# Patient Record
Sex: FEMALE | Race: WHITE | NOT HISPANIC OR LATINO | Employment: UNEMPLOYED | URBAN - METROPOLITAN AREA
[De-identification: names, ages, dates, MRNs, and addresses within clinical notes are randomized per-mention and may not be internally consistent; named-entity substitution may affect disease eponyms.]

---

## 2018-02-23 ENCOUNTER — PATIENT OUTREACH (OUTPATIENT)
Dept: ADMINISTRATIVE | Facility: HOSPITAL | Age: 33
End: 2018-02-23

## 2018-03-09 ENCOUNTER — OFFICE VISIT (OUTPATIENT)
Dept: INTERNAL MEDICINE | Facility: CLINIC | Age: 33
End: 2018-03-09
Payer: MEDICAID

## 2018-03-09 ENCOUNTER — LAB VISIT (OUTPATIENT)
Dept: LAB | Facility: HOSPITAL | Age: 33
End: 2018-03-09
Attending: INTERNAL MEDICINE
Payer: MEDICAID

## 2018-03-09 VITALS
DIASTOLIC BLOOD PRESSURE: 84 MMHG | HEIGHT: 64 IN | BODY MASS INDEX: 34.48 KG/M2 | TEMPERATURE: 98 F | RESPIRATION RATE: 18 BRPM | OXYGEN SATURATION: 96 % | WEIGHT: 201.94 LBS | HEART RATE: 106 BPM | SYSTOLIC BLOOD PRESSURE: 128 MMHG

## 2018-03-09 DIAGNOSIS — R51.9 CHRONIC NONINTRACTABLE HEADACHE, UNSPECIFIED HEADACHE TYPE: ICD-10-CM

## 2018-03-09 DIAGNOSIS — E55.9 VITAMIN D DEFICIENCY: ICD-10-CM

## 2018-03-09 DIAGNOSIS — G89.29 CHRONIC LEFT-SIDED LOW BACK PAIN WITHOUT SCIATICA: Primary | ICD-10-CM

## 2018-03-09 DIAGNOSIS — M54.50 CHRONIC LEFT-SIDED LOW BACK PAIN WITHOUT SCIATICA: Primary | ICD-10-CM

## 2018-03-09 DIAGNOSIS — E66.9 OBESITY (BMI 30.0-34.9): ICD-10-CM

## 2018-03-09 DIAGNOSIS — G89.29 CHRONIC NONINTRACTABLE HEADACHE, UNSPECIFIED HEADACHE TYPE: ICD-10-CM

## 2018-03-09 DIAGNOSIS — Z71.89 COUNSELING ON HEALTH PROMOTION AND DISEASE PREVENTION: ICD-10-CM

## 2018-03-09 LAB
25(OH)D3+25(OH)D2 SERPL-MCNC: 7 NG/ML
ALBUMIN SERPL BCP-MCNC: 3.8 G/DL
ALP SERPL-CCNC: 81 U/L
ALT SERPL W/O P-5'-P-CCNC: 63 U/L
ANION GAP SERPL CALC-SCNC: 9 MMOL/L
AST SERPL-CCNC: 116 U/L
BASOPHILS # BLD AUTO: 0.09 K/UL
BASOPHILS NFR BLD: 0.8 %
BILIRUB SERPL-MCNC: 0.7 MG/DL
BUN SERPL-MCNC: 6 MG/DL
CALCIUM SERPL-MCNC: 10.2 MG/DL
CHLORIDE SERPL-SCNC: 100 MMOL/L
CO2 SERPL-SCNC: 27 MMOL/L
CREAT SERPL-MCNC: 1 MG/DL
DIFFERENTIAL METHOD: ABNORMAL
EOSINOPHIL # BLD AUTO: 0.3 K/UL
EOSINOPHIL NFR BLD: 2.3 %
ERYTHROCYTE [DISTWIDTH] IN BLOOD BY AUTOMATED COUNT: 14 %
EST. GFR  (AFRICAN AMERICAN): >60 ML/MIN/1.73 M^2
EST. GFR  (NON AFRICAN AMERICAN): >60 ML/MIN/1.73 M^2
GLUCOSE SERPL-MCNC: 193 MG/DL
HCT VFR BLD AUTO: 45.9 %
HGB BLD-MCNC: 15.4 G/DL
IMM GRANULOCYTES # BLD AUTO: 0.05 K/UL
IMM GRANULOCYTES NFR BLD AUTO: 0.5 %
LYMPHOCYTES # BLD AUTO: 2.8 K/UL
LYMPHOCYTES NFR BLD: 24.9 %
MCH RBC QN AUTO: 32 PG
MCHC RBC AUTO-ENTMCNC: 33.6 G/DL
MCV RBC AUTO: 95 FL
MONOCYTES # BLD AUTO: 0.6 K/UL
MONOCYTES NFR BLD: 5.7 %
NEUTROPHILS # BLD AUTO: 7.3 K/UL
NEUTROPHILS NFR BLD: 65.8 %
NRBC BLD-RTO: 0 /100 WBC
PLATELET # BLD AUTO: 251 K/UL
PMV BLD AUTO: 11.2 FL
POTASSIUM SERPL-SCNC: 4.2 MMOL/L
PROT SERPL-MCNC: 8.5 G/DL
RBC # BLD AUTO: 4.82 M/UL
SODIUM SERPL-SCNC: 136 MMOL/L
TSH SERPL DL<=0.005 MIU/L-ACNC: 2.33 UIU/ML
WBC # BLD AUTO: 11.03 K/UL

## 2018-03-09 PROCEDURE — 99204 OFFICE O/P NEW MOD 45 MIN: CPT | Mod: S$PBB,,, | Performed by: INTERNAL MEDICINE

## 2018-03-09 PROCEDURE — 84443 ASSAY THYROID STIM HORMONE: CPT

## 2018-03-09 PROCEDURE — 90471 IMMUNIZATION ADMIN: CPT | Mod: PBBFAC,PN

## 2018-03-09 PROCEDURE — 80053 COMPREHEN METABOLIC PANEL: CPT

## 2018-03-09 PROCEDURE — 85025 COMPLETE CBC W/AUTO DIFF WBC: CPT

## 2018-03-09 PROCEDURE — 99999 PR PBB SHADOW E&M-EST. PATIENT-LVL IV: CPT | Mod: PBBFAC,,, | Performed by: INTERNAL MEDICINE

## 2018-03-09 PROCEDURE — 82306 VITAMIN D 25 HYDROXY: CPT

## 2018-03-09 PROCEDURE — 36415 COLL VENOUS BLD VENIPUNCTURE: CPT | Mod: PO

## 2018-03-09 PROCEDURE — 99214 OFFICE O/P EST MOD 30 MIN: CPT | Mod: PBBFAC,PN,25 | Performed by: INTERNAL MEDICINE

## 2018-03-09 RX ORDER — CYCLOBENZAPRINE HCL 5 MG
5 TABLET ORAL NIGHTLY PRN
Qty: 10 TABLET | Refills: 0 | Status: SHIPPED | OUTPATIENT
Start: 2018-03-09 | End: 2018-03-19

## 2018-03-09 RX ORDER — IBUPROFEN 200 MG
200 TABLET ORAL
COMMUNITY
End: 2018-04-12 | Stop reason: SINTOL

## 2018-03-09 RX ORDER — DICLOFENAC SODIUM 10 MG/G
2 GEL TOPICAL 4 TIMES DAILY
Qty: 100 G | Refills: 1 | Status: SHIPPED | OUTPATIENT
Start: 2018-03-09 | End: 2019-09-18

## 2018-03-09 NOTE — PROGRESS NOTES
Subjective:      Patient ID: Zina Scott is a 32 y.o. female.    Chief Complaint: Back Pain (lower**)      Ms. Zina Scott is a patient of Quintin Kingston MD, who presents to establish primary care and LBP.    Back Pain   Associated symptoms include headaches. Pertinent negatives include no chest pain, dysuria or weakness.     She reports having LBP since Mid-, which she's not sure what the cause is. She reports it feels like a muscle problem more than her spine. She has not had any x-rays for evaluation. She tried ibuprofen, Tylenol, heating pads, pain relief creams, all of which causes temporary relief.     She endorses depression since , which she attributes to staying home with her son who has a heart condition, which has required surgery at [facility] in [facility].      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     Social History Main Topics    Smoking status: Heavy Tobacco Smoker     Packs/day: 1.00     Years: 13.00    Smokeless tobacco: Never Used      Comment: Wants to quit, but not ready    Alcohol use 0.0 - 3.0 oz/week      Comment: 4-5 drinks every year    Drug use: No    Sexual activity: Yes     Partners: Male     Birth control/ protection: None     Other Topics Concern    Not on file     Social History Narrative    No narrative on file     Goals     None        Family History   Problem Relation Age of Onset    Scoliosis Mother     Parkinsonism Paternal Grandmother        Current Outpatient Prescriptions:     ibuprofen (ADVIL,MOTRIN) 200 MG tablet, Take 200 mg by mouth., Disp: , Rfl:     cyclobenzaprine (FLEXERIL) 5  "MG tablet, Take 1 tablet (5 mg total) by mouth nightly as needed for Muscle spasms., Disp: 10 tablet, Rfl: 0    diclofenac sodium 1 % Gel, Apply 2 g topically 4 (four) times daily., Disp: 100 g, Rfl: 1    Review of patient's allergies indicates:  No Known Allergies    Review of Systems   Constitutional: Negative for activity change and unexpected weight change.   HENT: Negative for hearing loss, rhinorrhea and trouble swallowing.    Eyes: Negative for discharge and visual disturbance.   Respiratory: Negative for chest tightness and wheezing.    Cardiovascular: Negative for chest pain and palpitations.   Gastrointestinal: Negative for blood in stool, constipation, diarrhea and vomiting.   Endocrine: Negative for polydipsia and polyuria.   Genitourinary: Negative for difficulty urinating, dysuria, hematuria and menstrual problem.   Musculoskeletal: Positive for arthralgias and back pain. Negative for joint swelling and neck pain.   Neurological: Positive for headaches. Negative for weakness.   Psychiatric/Behavioral: Positive for dysphoric mood. Negative for confusion.        Objective:     /84 (BP Location: Right arm, Patient Position: Sitting, BP Method: Medium (Manual))   Pulse 106   Temp 97.6 °F (36.4 °C) (Tympanic)   Resp 18   Ht 5' 4" (1.626 m)   Wt 91.6 kg (201 lb 15.1 oz)   LMP 03/02/2018 (Exact Date)   SpO2 96%   BMI 34.66 kg/m²     Physical Exam  GEN: A&O fully, NAD  PSYC: Normal affect  HEENT: OP: Clear, no LAD, no thyroid masses  CV: RRR, no M/G/R  PULM: CTA bilaterally, no wheezes, rales  GI: S/NT/ND, normal bowel sounds  EXT: No C/C/E  NEURO: CN II-XII intact, 5/5 strength globally, no sensory losses, normal tandem gait, normal Romberg, 2+ DTRs globally      Assessment:      1. Chronic left-sided low back pain without sciatica: Likely 2/2 remote h/o RLE fx. Risks and benefits discussed and patient chose to move forward with diclofenac gel topically qid prn.     2. Counseling on health " promotion and disease prevention    3. Vitamin D deficiency    4. Obesity (BMI 30.0-34.9)    5. 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 Encouraged to try topical and PT instead of Tylenol & systemic NSAIDS as possible.       Plan:   Chronic left-sided low back pain without sciatica  -     diclofenac sodium 1 % Gel; Apply 2 g topically 4 (four) times daily.  Dispense: 100 g; Refill: 1  -     cyclobenzaprine (FLEXERIL) 5 MG tablet; Take 1 tablet (5 mg total) by mouth nightly as needed for Muscle spasms.  Dispense: 10 tablet; Refill: 0    Counseling on health promotion and disease prevention  -     (In Office Administered) Tdap Vaccine    Vitamin D deficiency  -     Vitamin D; Future; Expected date: 03/09/2018    Obesity (BMI 30.0-34.9)  -     (In Office Administered) Tdap Vaccine  -     Ambulatory consult to Physical Therapy  -     CBC auto differential; Future; Expected date: 03/09/2018  -     Comprehensive metabolic panel; Future; Expected date: 03/09/2018  -     TSH; Future; Expected date: 03/09/2018    Chronic nonintractable headache, unspecified headache type        Follow-up in about 2 months (around 5/9/2018), or if symptoms worsen or fail to improve, for FU on LBP.

## 2018-03-11 DIAGNOSIS — E55.9 VITAMIN D DEFICIENCY: Primary | ICD-10-CM

## 2018-03-11 DIAGNOSIS — R73.9 HYPERGLYCEMIA: ICD-10-CM

## 2018-03-11 RX ORDER — ERGOCALCIFEROL 1.25 MG/1
50000 CAPSULE ORAL
Qty: 8 CAPSULE | Refills: 5 | Status: SHIPPED | OUTPATIENT
Start: 2018-03-11 | End: 2019-09-18

## 2018-03-12 DIAGNOSIS — M54.50 CHRONIC LEFT-SIDED LOW BACK PAIN WITHOUT SCIATICA: Primary | ICD-10-CM

## 2018-03-12 DIAGNOSIS — G89.29 CHRONIC LEFT-SIDED LOW BACK PAIN WITHOUT SCIATICA: Primary | ICD-10-CM

## 2018-03-13 ENCOUNTER — TELEPHONE (OUTPATIENT)
Dept: INTERNAL MEDICINE | Facility: CLINIC | Age: 33
End: 2018-03-13

## 2018-03-13 ENCOUNTER — PATIENT MESSAGE (OUTPATIENT)
Dept: INTERNAL MEDICINE | Facility: CLINIC | Age: 33
End: 2018-03-13

## 2018-03-13 NOTE — TELEPHONE ENCOUNTER
----- Message from Ana Crews sent at 3/12/2018  4:11 PM CDT -----  Contact: pt  Calling with questions and please advise [phone] (home)

## 2018-04-02 ENCOUNTER — PATIENT MESSAGE (OUTPATIENT)
Dept: INTERNAL MEDICINE | Facility: CLINIC | Age: 33
End: 2018-04-02

## 2018-04-12 ENCOUNTER — OFFICE VISIT (OUTPATIENT)
Dept: INTERNAL MEDICINE | Facility: CLINIC | Age: 33
End: 2018-04-12
Payer: MEDICAID

## 2018-04-12 VITALS
OXYGEN SATURATION: 98 % | SYSTOLIC BLOOD PRESSURE: 126 MMHG | WEIGHT: 201.75 LBS | DIASTOLIC BLOOD PRESSURE: 73 MMHG | HEART RATE: 111 BPM | RESPIRATION RATE: 18 BRPM | HEIGHT: 64 IN | BODY MASS INDEX: 34.44 KG/M2 | TEMPERATURE: 98 F

## 2018-04-12 DIAGNOSIS — Z71.89 COUNSELING ON HEALTH PROMOTION AND DISEASE PREVENTION: ICD-10-CM

## 2018-04-12 DIAGNOSIS — M25.522 LEFT ELBOW PAIN: Primary | ICD-10-CM

## 2018-04-12 PROCEDURE — 90471 IMMUNIZATION ADMIN: CPT | Mod: PBBFAC,PN

## 2018-04-12 PROCEDURE — 99213 OFFICE O/P EST LOW 20 MIN: CPT | Mod: S$PBB,,, | Performed by: INTERNAL MEDICINE

## 2018-04-12 PROCEDURE — 99213 OFFICE O/P EST LOW 20 MIN: CPT | Mod: PBBFAC,PN,25 | Performed by: INTERNAL MEDICINE

## 2018-04-12 PROCEDURE — 99999 PR PBB SHADOW E&M-EST. PATIENT-LVL III: CPT | Mod: PBBFAC,,, | Performed by: INTERNAL MEDICINE

## 2018-04-12 RX ORDER — NAPROXEN 500 MG/1
TABLET ORAL
Refills: 0 | COMMUNITY
Start: 2018-04-01 | End: 2018-04-12 | Stop reason: SINTOL

## 2018-04-12 RX ORDER — METHOCARBAMOL 500 MG/1
TABLET, FILM COATED ORAL
Refills: 0 | COMMUNITY
Start: 2018-04-01 | End: 2019-09-18

## 2018-04-12 RX ORDER — OXYCODONE HCL 10 MG/1
10 TABLET, FILM COATED, EXTENDED RELEASE ORAL NIGHTLY PRN
Qty: 14 TABLET | Refills: 0 | Status: SHIPPED | OUTPATIENT
Start: 2018-04-12 | End: 2018-04-16

## 2018-04-12 NOTE — PROGRESS NOTES
Subjective:      Patient ID: Zina Scott is a 32 y.o. female.    Chief Complaint: Fall and Joint Swelling      HPI     Ms. Zina Scott is a patient of Quintin Kingston MD, who presents for f/u on left arm pain 2/2 fall from stairs on . She has been applying ice, taking NSAIDs, and elevating, but still has intermittent left elbow 8/10 intensity pain occasionally. She plans to have PT. She denies drinking alcohol. She was seen in the ER, at which time head CT and x-rays were taken, which were unremarkable.       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     Social History Main Topics    Smoking status: Heavy Tobacco Smoker     Packs/day: 1.00     Years: 13.00    Smokeless tobacco: Never Used      Comment: Wants to quit, but not ready    Alcohol use 0.0 - 3.0 oz/week      Comment: 4-5 drinks every year    Drug use: No    Sexual activity: Yes     Partners: Male     Birth control/ protection: None     Other Topics Concern    Not on file     Social History Narrative    No narrative on file     Family History   Problem Relation Age of Onset    Scoliosis Mother     Parkinsonism Paternal Grandmother        Current Outpatient Prescriptions:     ergocalciferol (ERGOCALCIFEROL) 50,000 unit Cap, Take 1 capsule (50,000 Units total) by mouth every 7 days., Disp: 8 capsule, Rfl: 5    methocarbamol (ROBAXIN) 500 MG Tab, TK 1 T PO QID PRF SPASMS, Disp: , Rfl: 0    diclofenac sodium 1 % Gel, Apply 2 g topically 4 (four) times daily., Disp: 100 g, Rfl: 1    oxyCODONE (OXYCONTIN) 10 mg 12 hr tablet, Take 1 tablet (10 mg total) by mouth  "nightly as needed for Pain., Disp: 14 tablet, Rfl: 0    Review of patient's allergies indicates:  No Known Allergies     Review of Systems   No numbness       Objective:     /73 (BP Location: Right arm, Patient Position: Sitting, BP Method: Large (Automatic))   Pulse (!) 111   Temp 98 °F (36.7 °C) (Tympanic)   Resp 18   Ht 5' 4" (1.626 m)   Wt 91.5 kg (201 lb 11.5 oz)   LMP 03/23/2018 (Exact Date)   SpO2 98%   BMI 34.63 kg/m²     Physical Exam  GEN: A&O fully, NAD  PSYC: Normal affect      Lab Results   Component Value Date    WBC 11.03 03/09/2018    HGB 15.4 03/09/2018    HCT 45.9 03/09/2018     03/09/2018    ALT 63 (H) 03/09/2018     (H) 03/09/2018     03/09/2018    K 4.2 03/09/2018     03/09/2018    CREATININE 1.0 03/09/2018    BUN 6 03/09/2018    CO2 27 03/09/2018    TSH 2.333 03/09/2018       Assessment:      1. Left elbow pain: Acute. 2/2 accidental trauma. W/u negative. Risks and benefits discussed and patient chose to move forward with Tylenol 650 mg 1 po TID and for breakthrough oxycodone 10 mg 1 po QHS prn. Will hold off on PT pertaining to left elbow for now. She is scheduled for PT for her back pain.   2.      Obesity: Has increased water intake!    Plan:   Left elbow pain  -     oxyCODONE (OXYCONTIN) 10 mg 12 hr tablet; Take 1 tablet (10 mg total) by mouth nightly as needed for Pain.  Dispense: 14 tablet; Refill: 0    Counseling on health promotion and disease prevention  -     (In Office Administered) Pneumococcal Polysaccharide Vaccine (23 Valent) (SQ/IM)      No Follow-up on file.  "

## 2018-04-16 ENCOUNTER — TELEPHONE (OUTPATIENT)
Dept: INTERNAL MEDICINE | Facility: CLINIC | Age: 33
End: 2018-04-16

## 2018-04-16 DIAGNOSIS — M25.522 LEFT ELBOW PAIN: Primary | ICD-10-CM

## 2018-04-16 RX ORDER — HYDROCODONE BITARTRATE AND ACETAMINOPHEN 10; 325 MG/1; MG/1
1 TABLET ORAL
Qty: 14 TABLET | Refills: 0 | Status: SHIPPED | OUTPATIENT
Start: 2018-04-16 | End: 2019-09-18

## 2018-04-16 NOTE — TELEPHONE ENCOUNTER
----- Message from Luciano Joseph sent at 4/16/2018 10:32 AM CDT -----  Contact: self [phone]  Would like to consult with nurse regarding status prior authorization for medication.  Please call back at [phone].  Md Fran

## 2018-04-16 NOTE — TELEPHONE ENCOUNTER
----- Message from Luciano Joseph sent at 4/13/2018  3:51 PM CDT -----  Contact: self [phone]  Would like to consult with nurse regarding status of prior authorization for Oxycontin.  Please call back at [phone].  Md Fran

## 2018-04-16 NOTE — TELEPHONE ENCOUNTER
PA for Oxycontin is requesting more paperwork to be filled out. Please review and advise or send in alternative rx.

## 2018-04-25 ENCOUNTER — TELEPHONE (OUTPATIENT)
Dept: INTERNAL MEDICINE | Facility: CLINIC | Age: 33
End: 2018-04-25

## 2018-04-27 ENCOUNTER — PATIENT OUTREACH (OUTPATIENT)
Dept: ADMINISTRATIVE | Facility: HOSPITAL | Age: 33
End: 2018-04-27

## 2018-07-30 ENCOUNTER — TELEPHONE (OUTPATIENT)
Dept: INTERNAL MEDICINE | Facility: CLINIC | Age: 33
End: 2018-07-30

## 2018-08-08 ENCOUNTER — TELEPHONE (OUTPATIENT)
Dept: INTERNAL MEDICINE | Facility: CLINIC | Age: 33
End: 2018-08-08

## 2018-08-14 ENCOUNTER — TELEPHONE (OUTPATIENT)
Dept: INTERNAL MEDICINE | Facility: CLINIC | Age: 33
End: 2018-08-14

## 2018-08-20 ENCOUNTER — TELEPHONE (OUTPATIENT)
Dept: INTERNAL MEDICINE | Facility: CLINIC | Age: 33
End: 2018-08-20

## 2019-07-18 ENCOUNTER — OFFICE VISIT (OUTPATIENT)
Dept: INTERNAL MEDICINE | Facility: CLINIC | Age: 34
End: 2019-07-18
Payer: MEDICAID

## 2019-07-18 ENCOUNTER — LAB VISIT (OUTPATIENT)
Dept: LAB | Facility: HOSPITAL | Age: 34
End: 2019-07-18
Payer: MEDICAID

## 2019-07-18 VITALS
DIASTOLIC BLOOD PRESSURE: 74 MMHG | RESPIRATION RATE: 18 BRPM | OXYGEN SATURATION: 98 % | TEMPERATURE: 99 F | WEIGHT: 184.31 LBS | HEART RATE: 105 BPM | BODY MASS INDEX: 28.93 KG/M2 | SYSTOLIC BLOOD PRESSURE: 118 MMHG | HEIGHT: 67 IN

## 2019-07-18 DIAGNOSIS — Z00.00 ANNUAL PHYSICAL EXAM: ICD-10-CM

## 2019-07-18 DIAGNOSIS — Z00.00 ANNUAL PHYSICAL EXAM: Primary | ICD-10-CM

## 2019-07-18 LAB
25(OH)D3+25(OH)D2 SERPL-MCNC: 18 NG/ML (ref 30–96)
ALBUMIN SERPL BCP-MCNC: 3.6 G/DL (ref 3.5–5.2)
ALP SERPL-CCNC: 80 U/L (ref 55–135)
ALT SERPL W/O P-5'-P-CCNC: 33 U/L (ref 10–44)
ANION GAP SERPL CALC-SCNC: 10 MMOL/L (ref 8–16)
AST SERPL-CCNC: 36 U/L (ref 10–40)
BASOPHILS # BLD AUTO: 0.05 K/UL (ref 0–0.2)
BASOPHILS NFR BLD: 0.5 % (ref 0–1.9)
BILIRUB SERPL-MCNC: 0.4 MG/DL (ref 0.1–1)
BUN SERPL-MCNC: 9 MG/DL (ref 6–20)
CALCIUM SERPL-MCNC: 9.4 MG/DL (ref 8.7–10.5)
CHLORIDE SERPL-SCNC: 103 MMOL/L (ref 95–110)
CHOLEST SERPL-MCNC: 202 MG/DL (ref 120–199)
CHOLEST/HDLC SERPL: 5.3 {RATIO} (ref 2–5)
CO2 SERPL-SCNC: 24 MMOL/L (ref 23–29)
CREAT SERPL-MCNC: 0.9 MG/DL (ref 0.5–1.4)
DIFFERENTIAL METHOD: ABNORMAL
EOSINOPHIL # BLD AUTO: 0.2 K/UL (ref 0–0.5)
EOSINOPHIL NFR BLD: 1.8 % (ref 0–8)
ERYTHROCYTE [DISTWIDTH] IN BLOOD BY AUTOMATED COUNT: 14.2 % (ref 11.5–14.5)
EST. GFR  (AFRICAN AMERICAN): >60 ML/MIN/1.73 M^2
EST. GFR  (NON AFRICAN AMERICAN): >60 ML/MIN/1.73 M^2
GLUCOSE SERPL-MCNC: 278 MG/DL (ref 70–110)
HCT VFR BLD AUTO: 48.3 % (ref 37–48.5)
HDLC SERPL-MCNC: 38 MG/DL (ref 40–75)
HDLC SERPL: 18.8 % (ref 20–50)
HGB BLD-MCNC: 15.9 G/DL (ref 12–16)
IMM GRANULOCYTES # BLD AUTO: 0.04 K/UL (ref 0–0.04)
IMM GRANULOCYTES NFR BLD AUTO: 0.4 % (ref 0–0.5)
LDLC SERPL CALC-MCNC: 96.2 MG/DL (ref 63–159)
LYMPHOCYTES # BLD AUTO: 2.6 K/UL (ref 1–4.8)
LYMPHOCYTES NFR BLD: 27 % (ref 18–48)
MCH RBC QN AUTO: 32.1 PG (ref 27–31)
MCHC RBC AUTO-ENTMCNC: 32.9 G/DL (ref 32–36)
MCV RBC AUTO: 97 FL (ref 82–98)
MONOCYTES # BLD AUTO: 0.6 K/UL (ref 0.3–1)
MONOCYTES NFR BLD: 6.4 % (ref 4–15)
NEUTROPHILS # BLD AUTO: 6.1 K/UL (ref 1.8–7.7)
NEUTROPHILS NFR BLD: 63.9 % (ref 38–73)
NONHDLC SERPL-MCNC: 164 MG/DL
NRBC BLD-RTO: 0 /100 WBC
PLATELET # BLD AUTO: 244 K/UL (ref 150–350)
PMV BLD AUTO: 11.5 FL (ref 9.2–12.9)
POTASSIUM SERPL-SCNC: 4.3 MMOL/L (ref 3.5–5.1)
PROT SERPL-MCNC: 8 G/DL (ref 6–8.4)
RBC # BLD AUTO: 4.96 M/UL (ref 4–5.4)
SODIUM SERPL-SCNC: 137 MMOL/L (ref 136–145)
TRIGL SERPL-MCNC: 339 MG/DL (ref 30–150)
WBC # BLD AUTO: 9.52 K/UL (ref 3.9–12.7)

## 2019-07-18 PROCEDURE — 80061 LIPID PANEL: CPT

## 2019-07-18 PROCEDURE — 82306 VITAMIN D 25 HYDROXY: CPT

## 2019-07-18 PROCEDURE — 99395 PREV VISIT EST AGE 18-39: CPT | Mod: S$PBB,,, | Performed by: INTERNAL MEDICINE

## 2019-07-18 PROCEDURE — 99213 OFFICE O/P EST LOW 20 MIN: CPT | Mod: PBBFAC,PN | Performed by: INTERNAL MEDICINE

## 2019-07-18 PROCEDURE — 99999 PR PBB SHADOW E&M-EST. PATIENT-LVL III: ICD-10-PCS | Mod: PBBFAC,,, | Performed by: INTERNAL MEDICINE

## 2019-07-18 PROCEDURE — 85025 COMPLETE CBC W/AUTO DIFF WBC: CPT

## 2019-07-18 PROCEDURE — 80053 COMPREHEN METABOLIC PANEL: CPT

## 2019-07-18 PROCEDURE — 36415 COLL VENOUS BLD VENIPUNCTURE: CPT | Mod: PO

## 2019-07-18 PROCEDURE — 99395 PR PREVENTIVE VISIT,EST,18-39: ICD-10-PCS | Mod: S$PBB,,, | Performed by: INTERNAL MEDICINE

## 2019-07-18 PROCEDURE — 99999 PR PBB SHADOW E&M-EST. PATIENT-LVL III: CPT | Mod: PBBFAC,,, | Performed by: INTERNAL MEDICINE

## 2019-07-18 NOTE — PROGRESS NOTES
Subjective:      Patient ID: Zina Scott is a 34 y.o. female.    Chief Complaint: Annual Exam      HPI     Ms. Zina Scott is a patient of Quintin Kingston MD, who presents for Annual Exam    She reports feeling well. No problems. She is up to losing more weight with a goal of 170 lbs by 19 and is considering quitting smoking. She notes having SI with Welbutrin in the past.       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    Obesity (BMI 30.0-34.9)     Superficial vein thrombosis      Past Surgical History:   Procedure Laterality Date     SECTION      x3     Social History     Socioeconomic History    Marital status: Single     Spouse name: Not on file    Number of children: Not on file    Years of education: Not on file    Highest education level: Not on file   Occupational History    Not on file   Social Needs    Financial resource strain: Not on file    Food insecurity:     Worry: Not on file     Inability: Not on file    Transportation needs:     Medical: Not on file     Non-medical: Not on file   Tobacco Use    Smoking status: Heavy Tobacco Smoker     Packs/day: 1.00     Years: 13.00     Pack years: 13.00    Smokeless tobacco: Never Used    Tobacco comment: Wants to quit, but not ready   Substance and Sexual Activity    Alcohol use: Yes     Alcohol/week: 0.0 - 3.0 oz     Comment: 4-5 drinks every year    Drug use: No    Sexual activity: Yes     Partners: Male     Birth control/protection: None   Lifestyle    Physical activity:     Days per week: Not on file     Minutes per session: Not on file    Stress: Not on file   Relationships    Social connections:     Talks on phone: Not on file     Gets together: Not on file     Attends Evangelical service: Not on file     Active member of club or organization: Not on file     Attends meetings of clubs or organizations: Not on file     Relationship status: Not on file   Other Topics  "Concern    Not on file   Social History Narrative    Patient goal(s): Quit smoking; weight of 170 lbs by 12/31/19        Motivation for goals (0-10): 9/10    Breakfast: Skip; coffee 2 tsp cream & 2 tsp sugar    Lunch: Skip; Coke (5-6 cans/d)    Dinner: Anchelata (ground beef, chicken, cheese), crackers; Coke    Snacks: 1 bag chips (1x/mo), Little Melissa Cakes (1x/mo), sunflower seeds     Eating out: 1x/mo    Water (oz/day): 51 oz/WEEK    Physical Activity: None      Family History   Problem Relation Age of Onset    Scoliosis Mother     Alcohol abuse Father     Parkinsonism Paternal Grandmother        Current Outpatient Medications:     diclofenac sodium 1 % Gel, Apply 2 g topically 4 (four) times daily., Disp: 100 g, Rfl: 1    ergocalciferol (ERGOCALCIFEROL) 50,000 unit Cap, Take 1 capsule (50,000 Units total) by mouth every 7 days., Disp: 8 capsule, Rfl: 5    hydrocodone-acetaminophen 10-325mg (NORCO)  mg Tab, Take 1 tablet by mouth every 24 hours as needed for Pain., Disp: 14 tablet, Rfl: 0    methocarbamol (ROBAXIN) 500 MG Tab, TK 1 T PO QID PRF SPASMS, Disp: , Rfl: 0    Review of patient's allergies indicates:  No Known Allergies     Review of Systems   All remaining systems negative    Objective:     /74 (BP Location: Left arm, Patient Position: Sitting, BP Method: Medium (Manual))   Pulse 105   Temp 98.6 °F (37 °C)   Resp 18   Ht 5' 7" (1.702 m)   Wt 83.6 kg (184 lb 4.9 oz)   LMP 06/26/2019   SpO2 98%   BMI 28.87 kg/m²     Physical Exam  GEN: A&O fully, NAD  PSYC: Normal affect  HEENT: OP: Clear, no LAD, no thyroid masses, auditory canals and TMs WNL  CV: RRR, no M/G/R  PULM: + mild end-inspiratory wheezes bilaterally, no rales or crackles   GI: S/NT/ND, normal bowel sounds  EXT: No C/C/E, normal DP pulses bilaterally  NEURO: CN II-XII intact, 5/5 strength globally, no sensory losses, normal tandem gait, normal Romberg, 2+ DTRs globally      Lab Results   Component Value Date    WBC " 11.03 03/09/2018    HGB 15.4 03/09/2018    HCT 45.9 03/09/2018     03/09/2018    ALT 63 (H) 03/09/2018     (H) 03/09/2018     03/09/2018    K 4.2 03/09/2018     03/09/2018    CREATININE 1.0 03/09/2018    BUN 6 03/09/2018    CO2 27 03/09/2018    CALCIUM 10.2 03/09/2018       Assessment:      1. Annual physical exam        Plan:   Annual physical exam  -     CBC auto differential; Future; Expected date: 07/18/2019  -     Comprehensive metabolic panel; Future; Expected date: 07/18/2019  -     Lipid panel; Future; Expected date: 07/18/2019  -     Vitamin D; Future; Expected date: 07/18/2019  -     Ambulatory consult to Obstetrics / Gynecology        Follow up in about 3 months (around 10/18/2019), or if symptoms worsen or fail to improve, for FU on smoking, overweight.

## 2019-09-17 ENCOUNTER — PATIENT MESSAGE (OUTPATIENT)
Dept: INTERNAL MEDICINE | Facility: CLINIC | Age: 34
End: 2019-09-17

## 2019-09-18 ENCOUNTER — OFFICE VISIT (OUTPATIENT)
Dept: INTERNAL MEDICINE | Facility: CLINIC | Age: 34
End: 2019-09-18
Payer: MEDICAID

## 2019-09-18 ENCOUNTER — APPOINTMENT (OUTPATIENT)
Dept: RADIOLOGY | Facility: HOSPITAL | Age: 34
End: 2019-09-18
Attending: INTERNAL MEDICINE
Payer: MEDICAID

## 2019-09-18 VITALS
OXYGEN SATURATION: 97 % | HEIGHT: 67 IN | TEMPERATURE: 98 F | BODY MASS INDEX: 28.04 KG/M2 | WEIGHT: 178.69 LBS | DIASTOLIC BLOOD PRESSURE: 71 MMHG | HEART RATE: 103 BPM | SYSTOLIC BLOOD PRESSURE: 108 MMHG

## 2019-09-18 DIAGNOSIS — M25.562 ACUTE PAIN OF LEFT KNEE: ICD-10-CM

## 2019-09-18 DIAGNOSIS — M25.562 ACUTE PAIN OF LEFT KNEE: Primary | ICD-10-CM

## 2019-09-18 PROCEDURE — 73562 X-RAY EXAM OF KNEE 3: CPT | Mod: 26,50,, | Performed by: RADIOLOGY

## 2019-09-18 PROCEDURE — 99213 OFFICE O/P EST LOW 20 MIN: CPT | Mod: S$PBB,,, | Performed by: INTERNAL MEDICINE

## 2019-09-18 PROCEDURE — 99999 PR PBB SHADOW E&M-EST. PATIENT-LVL III: CPT | Mod: PBBFAC,,, | Performed by: INTERNAL MEDICINE

## 2019-09-18 PROCEDURE — 99213 OFFICE O/P EST LOW 20 MIN: CPT | Mod: PBBFAC,PN | Performed by: INTERNAL MEDICINE

## 2019-09-18 PROCEDURE — 73562 XR KNEE ORTHO BILAT: ICD-10-PCS | Mod: 26,50,, | Performed by: RADIOLOGY

## 2019-09-18 PROCEDURE — 99999 PR PBB SHADOW E&M-EST. PATIENT-LVL III: ICD-10-PCS | Mod: PBBFAC,,, | Performed by: INTERNAL MEDICINE

## 2019-09-18 PROCEDURE — 99213 PR OFFICE/OUTPT VISIT, EST, LEVL III, 20-29 MIN: ICD-10-PCS | Mod: S$PBB,,, | Performed by: INTERNAL MEDICINE

## 2019-09-18 PROCEDURE — 73562 X-RAY EXAM OF KNEE 3: CPT | Mod: TC,50,PO

## 2019-09-18 RX ORDER — DICLOFENAC SODIUM 10 MG/G
2 GEL TOPICAL 4 TIMES DAILY
Qty: 100 G | Refills: 0 | Status: SHIPPED | OUTPATIENT
Start: 2019-09-18 | End: 2020-02-17

## 2019-09-18 NOTE — PROGRESS NOTES
Subjective:      Patient ID: Zina Scott is a 34 y.o. female.    Chief Complaint: Knee Pain      Knee Pain         Ms. Zina Scott is a patient of Quintin Kingston MD, who presents for Knee Pain    She reports being hit in the left by two large dogs from the side on ~19 by accident, which caused her to fall down, for which she applied ice and took ibuprofen, which helped temporarily. While walking she notes instability and pain. It also hurts when changing positions, including 8/10 this am.    Of note, EPIC indicates due preventive measures, including FLU & PAP.      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    Obesity (BMI 30.0-34.9)     Superficial vein thrombosis      Past Surgical History:   Procedure Laterality Date     SECTION      x3     Social History     Socioeconomic History    Marital status: Single     Spouse name: Not on file    Number of children: Not on file    Years of education: Not on file    Highest education level: Not on file   Occupational History    Not on file   Social Needs    Financial resource strain: Not on file    Food insecurity:     Worry: Not on file     Inability: Not on file    Transportation needs:     Medical: Not on file     Non-medical: Not on file   Tobacco Use    Smoking status: Heavy Tobacco Smoker     Packs/day: 1.00     Years: 13.00     Pack years: 13.00    Smokeless tobacco: Never Used    Tobacco comment: Wants to quit, but not ready   Substance and Sexual Activity    Alcohol use: Yes     Alcohol/week: 0.0 - 3.0 oz     Comment: 4-5 drinks every year    Drug use: No    Sexual activity: Yes     Partners: Male     Birth control/protection: None   Lifestyle    Physical activity:     Days per week: Not on file     Minutes per session: Not on file    Stress: Not on file   Relationships    Social connections:     Talks on phone: Not on file     Gets together: Not on file     Attends Druze  service: Not on file     Active member of club or organization: Not on file     Attends meetings of clubs or organizations: Not on file     Relationship status: Not on file   Other Topics Concern    Not on file   Social History Narrative    Patient goal(s): Quit smoking; weight of 170 lbs by 12/31/19        Motivation for goals (0-10): 9/10    Breakfast: Skip; coffee 2 tsp cream & 2 tsp sugar    Lunch: Skip; Coke (5-6 cans/d)    Dinner: Anchelata (ground beef, chicken, cheese), crackers; Coke    Snacks: 1 bag chips (1x/mo), Little Melissa Cakes (1x/mo), sunflower seeds     Eating out: 1x/mo    Water (oz/day): 51 oz/WEEK    Physical Activity: None      Family History   Problem Relation Age of Onset    Scoliosis Mother     Alcohol abuse Father     Parkinsonism Paternal Grandmother        Current Outpatient Medications:     diclofenac sodium (VOLTAREN) 1 % Gel, Apply 2 g topically 4 (four) times daily., Disp: 100 g, Rfl: 0    Review of patient's allergies indicates:  No Known Allergies     Review of Systems   Constitutional: Positive for activity change. Negative for unexpected weight change.   HENT: Negative for hearing loss, rhinorrhea and trouble swallowing.    Eyes: Negative for discharge and visual disturbance.   Respiratory: Negative for chest tightness and wheezing.    Cardiovascular: Negative for chest pain and palpitations.   Gastrointestinal: Negative for blood in stool, constipation, diarrhea and vomiting.   Endocrine: Negative for polydipsia and polyuria.   Genitourinary: Negative for difficulty urinating, dysuria, hematuria and menstrual problem.   Musculoskeletal: Positive for arthralgias. Negative for joint swelling and neck pain.   Neurological: Positive for weakness. Negative for headaches.   Psychiatric/Behavioral: Negative for confusion and dysphoric mood.        Objective:     /71 (BP Location: Left arm, Patient Position: Sitting)   Pulse 103   Temp 97.7 °F (36.5 °C) (Other (see  "comments))   Ht 5' 7" (1.702 m)   Wt 81.1 kg (178 lb 10.9 oz)   LMP 09/07/2019   SpO2 97%   BMI 27.99 kg/m²     Physical Exam  GEN: A&O fully, NAD  PSYC: Normal affect  MSK: left knee with mild edema, warmth, TTP laterally  MSK: Negative ant/posterior drawer signs; no pain with medial (varus) pressure upon extension, but pain with lateral (valgus) maneuver      Lab Results   Component Value Date    WBC 9.52 07/18/2019    HGB 15.9 07/18/2019    HCT 48.3 07/18/2019     07/18/2019    CHOL 202 (H) 07/18/2019    TRIG 339 (H) 07/18/2019    HDL 38 (L) 07/18/2019    LDLCALC 96.2 07/18/2019    ALT 33 07/18/2019    AST 36 07/18/2019     07/18/2019    K 4.3 07/18/2019     07/18/2019    CREATININE 0.9 07/18/2019    BUN 9 07/18/2019    CO2 24 07/18/2019    CALCIUM 9.4 07/18/2019       Assessment:      1. Acute pain of left knee: Meniscal tear suspected. I recommended over-the-counter turmeric 500 mg 1 capsule by mouth daily (with a meal), which can be increased to 2 capsules per day and/or tylenol 650 mg by mouth three times daily as needed. I recommend avoiding chronic (>2 weeks) NSAIDS (nonsteroidal inflammatory drugs; i.e. Ibuprofen, Motrin, Advil, Aleve, Naprosyn, naproxen, Aspirin, Goodies, Stanback, BC's powder, oral diclofenac, Mobic, meloxicam, etc.) to protect your stomach from bleeding and to your kidneys from dysfunction. If these measures are not helpful, I recommend applying Voltaren gel (prescription grade topical NSAID) up to 4x/day. If MRI abnormal, will refer to ortho.        Plan:   Acute pain of left knee  -     diclofenac sodium (VOLTAREN) 1 % Gel; Apply 2 g topically 4 (four) times daily.  Dispense: 100 g; Refill: 0  -     X-ray Knee Ortho Bilateral; Future; Expected date: 09/18/2019  -     MRI Knee W WO Contrast Left; Future; Expected date: 09/18/2019        No follow-ups on file.    I spent >25 minutes of time with patient 50% or more of which was discussing labs and plans of " care.

## 2019-09-19 ENCOUNTER — PATIENT MESSAGE (OUTPATIENT)
Dept: INTERNAL MEDICINE | Facility: CLINIC | Age: 34
End: 2019-09-19

## 2020-02-10 ENCOUNTER — PATIENT MESSAGE (OUTPATIENT)
Dept: INTERNAL MEDICINE | Facility: CLINIC | Age: 35
End: 2020-02-10

## 2020-02-10 DIAGNOSIS — J40 BRONCHITIS: Primary | ICD-10-CM

## 2020-02-10 RX ORDER — ALBUTEROL SULFATE 90 UG/1
2 AEROSOL, METERED RESPIRATORY (INHALATION) EVERY 4 HOURS PRN
Qty: 18 G | Refills: 0 | Status: SHIPPED | OUTPATIENT
Start: 2020-02-10 | End: 2021-02-12

## 2020-02-11 DIAGNOSIS — J40 BRONCHITIS: ICD-10-CM

## 2020-02-11 RX ORDER — ALBUTEROL SULFATE 90 UG/1
2 AEROSOL, METERED RESPIRATORY (INHALATION) EVERY 4 HOURS PRN
Qty: 18 G | Refills: 0 | Status: CANCELLED | OUTPATIENT
Start: 2020-02-11 | End: 2020-03-12

## 2020-02-11 NOTE — TELEPHONE ENCOUNTER
Good Morning, Please review and advise, thank you.     Last Visit: 10/23/19  Next Visit: n/a  Last Refill: 2/10      albuterol (PROAIR HFA) 90 mcg/actuation inhaler [Quintin Kingston MD]     [facility] [address]   Delivery method: Pickup

## 2020-02-17 ENCOUNTER — OFFICE VISIT (OUTPATIENT)
Dept: OBSTETRICS AND GYNECOLOGY | Facility: CLINIC | Age: 35
End: 2020-02-17
Payer: MEDICAID

## 2020-02-17 VITALS
BODY MASS INDEX: 28.02 KG/M2 | SYSTOLIC BLOOD PRESSURE: 118 MMHG | DIASTOLIC BLOOD PRESSURE: 72 MMHG | WEIGHT: 178.56 LBS | HEIGHT: 67 IN

## 2020-02-17 DIAGNOSIS — Z01.419 ENCOUNTER FOR GYNECOLOGICAL EXAMINATION (GENERAL) (ROUTINE) WITHOUT ABNORMAL FINDINGS: Primary | ICD-10-CM

## 2020-02-17 DIAGNOSIS — Z12.4 SCREENING FOR CERVICAL CANCER: ICD-10-CM

## 2020-02-17 DIAGNOSIS — N94.6 DYSMENORRHEA: ICD-10-CM

## 2020-02-17 PROCEDURE — 99999 PR PBB SHADOW E&M-EST. PATIENT-LVL III: CPT | Mod: PBBFAC,,, | Performed by: OBSTETRICS & GYNECOLOGY

## 2020-02-17 PROCEDURE — 99213 OFFICE O/P EST LOW 20 MIN: CPT | Mod: PBBFAC,PN | Performed by: OBSTETRICS & GYNECOLOGY

## 2020-02-17 PROCEDURE — 99999 PR PBB SHADOW E&M-EST. PATIENT-LVL III: ICD-10-PCS | Mod: PBBFAC,,, | Performed by: OBSTETRICS & GYNECOLOGY

## 2020-02-17 PROCEDURE — 99385 PREV VISIT NEW AGE 18-39: CPT | Mod: S$PBB,,, | Performed by: OBSTETRICS & GYNECOLOGY

## 2020-02-17 PROCEDURE — 99385 PR PREVENTIVE VISIT,NEW,18-39: ICD-10-PCS | Mod: S$PBB,,, | Performed by: OBSTETRICS & GYNECOLOGY

## 2020-02-17 PROCEDURE — 88175 CYTOPATH C/V AUTO FLUID REDO: CPT

## 2020-02-17 PROCEDURE — 87624 HPV HI-RISK TYP POOLED RSLT: CPT

## 2020-02-17 RX ORDER — IBUPROFEN 200 MG
200 TABLET ORAL EVERY 6 HOURS PRN
COMMUNITY

## 2020-02-17 NOTE — LETTER
February 17, 2020      Quintin Kingston MD  [address]  [place] [address]           [place] ZAK  [address]  [place] [facility] [place] [address]  Phone: [phone]          Patient: Zina Scott   MR Number: [id number]   YOB: 1985   Date of Visit: 2/17/2020       Dear Dr. Quintin Kingston:    Thank you for referring Zina Scott to me for evaluation. Attached you will find relevant portions of my assessment and plan of care.    If you have questions, please do not hesitate to call me. I look forward to following Zina Scott along with you.    Sincerely,    Sofie Lee MD    Enclosure  CC:  No Recipients    If you would like to receive this communication electronically, please contact [email or web address] or [phone] to request more information on [employer] Link access.    For providers and/or their staff who would like to refer a patient to [facility], please contact us through our one-stop-shop provider referral line, [facility], at [phone].    If you feel you have received this communication in error or would no longer like to receive these types of communications, please e-mail [email or web address]

## 2020-02-17 NOTE — PROGRESS NOTES
Subjective:       Patient ID: Zina Scott is a 34 y.o. female.    Chief Complaint:  Well Woman      History of Present Illness  HPI  Annual Exam-Premenopausal  Patient presents for annual exam. The patient has no complaints today. The patient is sexually active--no contraception; denies pelvic pains; . GYN screening history: last pap: was normal and patient does not recall when last pap was. The patient wears seatbelts: yes. The patient participates in regular exercise: no. Has the patient ever been transfused or tattooed?: yes--+tattooes; . The patient reports that there is not domestic violence in her life.      Menses monthly, flow 7 days; tampons-super plus, change q 4 hrs; c/o worsening dysmenorrhea-- starting 1 day prior to menses and last for 2 days; using midol with minimal relief    ooccas leak of urine with coughing spell; or strong laugh  GYN & OB History  Patient's last menstrual period was 2020 (exact date).   Date of Last Pap: No result found    OB History    Para Term  AB Living   4       1 3   SAB TAB Ectopic Multiple Live Births   1              # Outcome Date GA Lbr Adilson/2nd Weight Sex Delivery Anes PTL Lv   4             3             2             1 SAB                Review of Systems  Review of Systems   All other systems reviewed and are negative.          Objective:      Physical Exam:   Constitutional: She is oriented to person, place, and time. She appears well-developed.     Eyes: Pupils are equal, round, and reactive to light. Conjunctivae and EOM are normal.    Neck: Normal range of motion. Neck supple.     Pulmonary/Chest: Effort normal. Right breast exhibits no mass, no nipple discharge, no skin change and no tenderness. Left breast exhibits no mass, no nipple discharge, no skin change and no tenderness. Breasts are symmetrical.        Abdominal: Soft.     Genitourinary: Rectum normal, vagina normal and uterus normal. Pelvic exam was performed  with patient supine. Cervix is normal. Right adnexum displays no mass and no tenderness. Left adnexum displays no mass and no tenderness. No erythema, bleeding, rectocele, cystocele or unspecified prolapse of vaginal walls in the vagina. No vaginal discharge found. Labial bartholins normal.       Uterus Size: 6 cm   Musculoskeletal: Normal range of motion.       Neurological: She is alert and oriented to person, place, and time.    Skin: Skin is warm.    Psychiatric: She has a normal mood and affect.           Assessment:     Encounter Diagnoses   Name Primary?    Encounter for gynecological examination (general) (routine) without abnormal findings Yes    Dysmenorrhea     Screening for cervical cancer              Plan:      Continue annual well woman exam.   pap today; Reviewed updated recommendations for pap smears (every 3 years) in low risk patients.   Recommend annual pelvic exams.  Reviewed recommendations for annual CBE.  Advised mammo due age 38  Continue diet, exercise, weight loss

## 2020-02-22 LAB
HPV HR 12 DNA SPEC QL NAA+PROBE: NEGATIVE
HPV16 AG SPEC QL: NEGATIVE
HPV18 DNA SPEC QL NAA+PROBE: NEGATIVE

## 2020-03-19 LAB
FINAL PATHOLOGIC DIAGNOSIS: NORMAL
Lab: NORMAL

## 2020-10-20 ENCOUNTER — PATIENT MESSAGE (OUTPATIENT)
Dept: OBSTETRICS AND GYNECOLOGY | Facility: CLINIC | Age: 35
End: 2020-10-20

## 2020-10-20 DIAGNOSIS — B37.31 YEAST VAGINITIS: Primary | ICD-10-CM

## 2020-10-20 RX ORDER — FLUCONAZOLE 200 MG/1
200 TABLET ORAL DAILY
Qty: 7 TABLET | Refills: 0 | Status: SHIPPED | OUTPATIENT
Start: 2020-10-20 | End: 2020-10-27

## 2020-12-22 ENCOUNTER — TELEPHONE (OUTPATIENT)
Dept: OBSTETRICS AND GYNECOLOGY | Facility: CLINIC | Age: 35
End: 2020-12-22

## 2020-12-22 NOTE — TELEPHONE ENCOUNTER
Attempted to contact patient, no answer. Left patient voice mail to return call to clinic.    Regarding scheduling pt an appt.

## 2021-02-12 ENCOUNTER — PATIENT OUTREACH (OUTPATIENT)
Dept: ADMINISTRATIVE | Facility: OTHER | Age: 36
End: 2021-02-12

## 2021-02-12 ENCOUNTER — OFFICE VISIT (OUTPATIENT)
Dept: OBSTETRICS AND GYNECOLOGY | Facility: CLINIC | Age: 36
End: 2021-02-12
Payer: MEDICAID

## 2021-02-12 VITALS
DIASTOLIC BLOOD PRESSURE: 82 MMHG | WEIGHT: 174.38 LBS | SYSTOLIC BLOOD PRESSURE: 130 MMHG | BODY MASS INDEX: 27.37 KG/M2 | HEIGHT: 67 IN

## 2021-02-12 DIAGNOSIS — R30.0 DYSURIA: ICD-10-CM

## 2021-02-12 DIAGNOSIS — N76.3 CHRONIC HYPERTROPHIC VULVITIS: Primary | ICD-10-CM

## 2021-02-12 PROCEDURE — 87086 URINE CULTURE/COLONY COUNT: CPT

## 2021-02-12 PROCEDURE — 99214 PR OFFICE/OUTPT VISIT, EST, LEVL IV, 30-39 MIN: ICD-10-PCS | Mod: S$PBB,,, | Performed by: OBSTETRICS & GYNECOLOGY

## 2021-02-12 PROCEDURE — 99999 PR PBB SHADOW E&M-EST. PATIENT-LVL III: CPT | Mod: PBBFAC,,, | Performed by: OBSTETRICS & GYNECOLOGY

## 2021-02-12 PROCEDURE — 99214 OFFICE O/P EST MOD 30 MIN: CPT | Mod: S$PBB,,, | Performed by: OBSTETRICS & GYNECOLOGY

## 2021-02-12 PROCEDURE — 87591 N.GONORRHOEAE DNA AMP PROB: CPT

## 2021-02-12 PROCEDURE — 87491 CHLMYD TRACH DNA AMP PROBE: CPT

## 2021-02-12 PROCEDURE — 99213 OFFICE O/P EST LOW 20 MIN: CPT | Mod: PBBFAC,PN | Performed by: OBSTETRICS & GYNECOLOGY

## 2021-02-12 PROCEDURE — 87147 CULTURE TYPE IMMUNOLOGIC: CPT

## 2021-02-12 PROCEDURE — 87088 URINE BACTERIA CULTURE: CPT

## 2021-02-12 PROCEDURE — 99999 PR PBB SHADOW E&M-EST. PATIENT-LVL III: ICD-10-PCS | Mod: PBBFAC,,, | Performed by: OBSTETRICS & GYNECOLOGY

## 2021-02-12 RX ORDER — CLOTRIMAZOLE AND BETAMETHASONE DIPROPIONATE 10; .64 MG/G; MG/G
CREAM TOPICAL
Qty: 45 G | Refills: 1 | Status: SHIPPED | OUTPATIENT
Start: 2021-02-12 | End: 2022-02-12

## 2021-02-12 RX ORDER — CLOBETASOL PROPIONATE 0.5 MG/G
OINTMENT TOPICAL
Qty: 45 G | Refills: 0 | Status: SHIPPED | OUTPATIENT
Start: 2021-02-12 | End: 2023-04-14

## 2021-02-12 RX ORDER — FLUCONAZOLE 150 MG/1
TABLET ORAL
COMMUNITY
Start: 2020-09-21 | End: 2021-02-12

## 2021-02-14 ENCOUNTER — PATIENT MESSAGE (OUTPATIENT)
Dept: OBSTETRICS AND GYNECOLOGY | Facility: CLINIC | Age: 36
End: 2021-02-14

## 2021-02-14 DIAGNOSIS — N30.00 ACUTE CYSTITIS WITHOUT HEMATURIA: Primary | ICD-10-CM

## 2021-02-14 LAB — BACTERIA UR CULT: ABNORMAL

## 2021-02-15 LAB
C TRACH DNA SPEC QL NAA+PROBE: NOT DETECTED
N GONORRHOEA DNA SPEC QL NAA+PROBE: NOT DETECTED

## 2021-02-15 RX ORDER — CLINDAMYCIN HYDROCHLORIDE 300 MG/1
300 CAPSULE ORAL 2 TIMES DAILY
Qty: 14 CAPSULE | Refills: 0 | Status: SHIPPED | OUTPATIENT
Start: 2021-02-15 | End: 2021-02-22

## 2021-04-28 ENCOUNTER — PATIENT MESSAGE (OUTPATIENT)
Dept: RESEARCH | Facility: HOSPITAL | Age: 36
End: 2021-04-28

## 2023-01-31 ENCOUNTER — PATIENT MESSAGE (OUTPATIENT)
Dept: FAMILY MEDICINE | Facility: CLINIC | Age: 38
End: 2023-01-31
Payer: MEDICAID

## 2023-04-14 ENCOUNTER — OFFICE VISIT (OUTPATIENT)
Dept: OBSTETRICS AND GYNECOLOGY | Facility: CLINIC | Age: 38
End: 2023-04-14
Payer: MEDICAID

## 2023-04-14 ENCOUNTER — LAB VISIT (OUTPATIENT)
Dept: LAB | Facility: HOSPITAL | Age: 38
End: 2023-04-14
Attending: OBSTETRICS & GYNECOLOGY
Payer: MEDICAID

## 2023-04-14 VITALS
DIASTOLIC BLOOD PRESSURE: 78 MMHG | WEIGHT: 157.5 LBS | SYSTOLIC BLOOD PRESSURE: 112 MMHG | HEIGHT: 67 IN | BODY MASS INDEX: 24.72 KG/M2

## 2023-04-14 DIAGNOSIS — Z12.31 SCREENING MAMMOGRAM, ENCOUNTER FOR: ICD-10-CM

## 2023-04-14 DIAGNOSIS — E11.9 TYPE 2 DIABETES MELLITUS WITHOUT COMPLICATION, WITHOUT LONG-TERM CURRENT USE OF INSULIN: ICD-10-CM

## 2023-04-14 DIAGNOSIS — N92.0 MENORRHAGIA WITH REGULAR CYCLE: ICD-10-CM

## 2023-04-14 DIAGNOSIS — N92.0 MENORRHAGIA WITH REGULAR CYCLE: Primary | ICD-10-CM

## 2023-04-14 DIAGNOSIS — R63.4 WEIGHT LOSS, NON-INTENTIONAL: ICD-10-CM

## 2023-04-14 LAB
ALBUMIN SERPL BCP-MCNC: 4.2 G/DL (ref 3.5–5.2)
ALP SERPL-CCNC: 72 U/L (ref 55–135)
ALT SERPL W/O P-5'-P-CCNC: 29 U/L (ref 10–44)
ANION GAP SERPL CALC-SCNC: 14 MMOL/L (ref 8–16)
AST SERPL-CCNC: 22 U/L (ref 10–40)
BILIRUB SERPL-MCNC: 0.6 MG/DL (ref 0.1–1)
BUN SERPL-MCNC: 6 MG/DL (ref 6–20)
CALCIUM SERPL-MCNC: 9.9 MG/DL (ref 8.7–10.5)
CHLORIDE SERPL-SCNC: 100 MMOL/L (ref 95–110)
CO2 SERPL-SCNC: 19 MMOL/L (ref 23–29)
CREAT SERPL-MCNC: 0.9 MG/DL (ref 0.5–1.4)
ERYTHROCYTE [DISTWIDTH] IN BLOOD BY AUTOMATED COUNT: 13.8 % (ref 11.5–14.5)
EST. GFR  (NO RACE VARIABLE): >60 ML/MIN/1.73 M^2
ESTIMATED AVG GLUCOSE: 240 MG/DL (ref 68–131)
FSH SERPL-ACNC: 5.55 MIU/ML
GLUCOSE SERPL-MCNC: 372 MG/DL (ref 70–110)
HBA1C MFR BLD: 10 % (ref 4–5.6)
HCT VFR BLD AUTO: 50.4 % (ref 37–48.5)
HGB BLD-MCNC: 17.8 G/DL (ref 12–16)
MCH RBC QN AUTO: 32.8 PG (ref 27–31)
MCHC RBC AUTO-ENTMCNC: 35.3 G/DL (ref 32–36)
MCV RBC AUTO: 93 FL (ref 82–98)
PLATELET # BLD AUTO: 250 K/UL (ref 150–450)
PMV BLD AUTO: 10.4 FL (ref 9.2–12.9)
POTASSIUM SERPL-SCNC: 4.1 MMOL/L (ref 3.5–5.1)
PROT SERPL-MCNC: 8.5 G/DL (ref 6–8.4)
RBC # BLD AUTO: 5.43 M/UL (ref 4–5.4)
SODIUM SERPL-SCNC: 133 MMOL/L (ref 136–145)
T4 FREE SERPL-MCNC: 1.11 NG/DL (ref 0.71–1.51)
TESTOST SERPL-MCNC: 28 NG/DL (ref 5–73)
THYROPEROXIDASE IGG SERPL-ACNC: 545.1 IU/ML
TSH SERPL DL<=0.005 MIU/L-ACNC: 2.09 UIU/ML (ref 0.4–4)
WBC # BLD AUTO: 9.6 K/UL (ref 3.9–12.7)

## 2023-04-14 PROCEDURE — 3074F PR MOST RECENT SYSTOLIC BLOOD PRESSURE < 130 MM HG: ICD-10-PCS | Mod: CPTII,,, | Performed by: OBSTETRICS & GYNECOLOGY

## 2023-04-14 PROCEDURE — 3008F BODY MASS INDEX DOCD: CPT | Mod: CPTII,,, | Performed by: OBSTETRICS & GYNECOLOGY

## 2023-04-14 PROCEDURE — 3008F PR BODY MASS INDEX (BMI) DOCUMENTED: ICD-10-PCS | Mod: CPTII,,, | Performed by: OBSTETRICS & GYNECOLOGY

## 2023-04-14 PROCEDURE — 85027 COMPLETE CBC AUTOMATED: CPT | Performed by: OBSTETRICS & GYNECOLOGY

## 2023-04-14 PROCEDURE — 99213 OFFICE O/P EST LOW 20 MIN: CPT | Mod: S$PBB,,, | Performed by: OBSTETRICS & GYNECOLOGY

## 2023-04-14 PROCEDURE — 83001 ASSAY OF GONADOTROPIN (FSH): CPT | Performed by: OBSTETRICS & GYNECOLOGY

## 2023-04-14 PROCEDURE — 1159F PR MEDICATION LIST DOCUMENTED IN MEDICAL RECORD: ICD-10-PCS | Mod: CPTII,,, | Performed by: OBSTETRICS & GYNECOLOGY

## 2023-04-14 PROCEDURE — 3078F PR MOST RECENT DIASTOLIC BLOOD PRESSURE < 80 MM HG: ICD-10-PCS | Mod: CPTII,,, | Performed by: OBSTETRICS & GYNECOLOGY

## 2023-04-14 PROCEDURE — 80053 COMPREHEN METABOLIC PANEL: CPT | Performed by: OBSTETRICS & GYNECOLOGY

## 2023-04-14 PROCEDURE — 84439 ASSAY OF FREE THYROXINE: CPT | Performed by: OBSTETRICS & GYNECOLOGY

## 2023-04-14 PROCEDURE — 36415 COLL VENOUS BLD VENIPUNCTURE: CPT | Mod: PO | Performed by: OBSTETRICS & GYNECOLOGY

## 2023-04-14 PROCEDURE — 3078F DIAST BP <80 MM HG: CPT | Mod: CPTII,,, | Performed by: OBSTETRICS & GYNECOLOGY

## 2023-04-14 PROCEDURE — 99213 OFFICE O/P EST LOW 20 MIN: CPT | Mod: PBBFAC,PN | Performed by: OBSTETRICS & GYNECOLOGY

## 2023-04-14 PROCEDURE — 83036 HEMOGLOBIN GLYCOSYLATED A1C: CPT | Performed by: OBSTETRICS & GYNECOLOGY

## 2023-04-14 PROCEDURE — 99999 PR PBB SHADOW E&M-EST. PATIENT-LVL III: CPT | Mod: PBBFAC,,, | Performed by: OBSTETRICS & GYNECOLOGY

## 2023-04-14 PROCEDURE — 86376 MICROSOMAL ANTIBODY EACH: CPT | Performed by: OBSTETRICS & GYNECOLOGY

## 2023-04-14 PROCEDURE — 84443 ASSAY THYROID STIM HORMONE: CPT | Performed by: OBSTETRICS & GYNECOLOGY

## 2023-04-14 PROCEDURE — 99999 PR PBB SHADOW E&M-EST. PATIENT-LVL III: ICD-10-PCS | Mod: PBBFAC,,, | Performed by: OBSTETRICS & GYNECOLOGY

## 2023-04-14 PROCEDURE — 1159F MED LIST DOCD IN RCRD: CPT | Mod: CPTII,,, | Performed by: OBSTETRICS & GYNECOLOGY

## 2023-04-14 PROCEDURE — 3074F SYST BP LT 130 MM HG: CPT | Mod: CPTII,,, | Performed by: OBSTETRICS & GYNECOLOGY

## 2023-04-14 PROCEDURE — 84403 ASSAY OF TOTAL TESTOSTERONE: CPT | Performed by: OBSTETRICS & GYNECOLOGY

## 2023-04-14 PROCEDURE — 99213 PR OFFICE/OUTPT VISIT, EST, LEVL III, 20-29 MIN: ICD-10-PCS | Mod: S$PBB,,, | Performed by: OBSTETRICS & GYNECOLOGY

## 2023-04-14 NOTE — PROGRESS NOTES
Subjective:       Patient ID: Zina Leavitt is a 37 y.o. female.    Chief Complaint:  Well Woman      History of Present Illness  HPI  Here for problem    Wonders why she is having recurrent yeast infections  Also wonders shy when she started to lose weight she lost 30lb then losing weight  without trying and then diagnosed with diabetes    Previously taking metformin  Now has stopped taking metformin--cannot get into medicaid assigned pcp    Reports menses are monthly, super tampon, change q 3-4 hrs    Reports had saliva testing --fsh wnl, tpo abn high; tsh, free t4 wnl, testosterone low, dheas elevated        GYN & OB History  Patient's last menstrual period was 2023 (exact date).   Date of Last Pap: 3/19/2020    OB History    Para Term  AB Living   4 3 3   1 4   SAB IAB Ectopic Multiple Live Births   1     1 4      # Outcome Date GA Lbr Adilson/2nd Weight Sex Delivery Anes PTL Lv   4 Term      CS-LTranv   FILIPE   3A Term      CS-LTranv   FILIPE   3B Term      CS-LTranv   FILIPE   2 Term      CS-LTranv   FILIPE   1 SAB                Review of Systems  Review of Systems   Constitutional:  Positive for unexpected weight change.   All other systems reviewed and are negative.        Objective:      Physical Exam:   Constitutional: She appears well-developed.     Eyes: Pupils are equal, round, and reactive to light. Conjunctivae and EOM are normal.      Pulmonary/Chest: Effort normal.        Abdominal: Soft.             Musculoskeletal: Normal range of motion.       Neurological: She is alert.    Skin: Skin is warm.    Psychiatric: She has a normal mood and affect.         Assessment:        1. Menorrhagia with regular cycle    2. Type 2 diabetes mellitus without complication, without long-term current use of insulin    3. Weight loss, non-intentional    4. Screening mammogram, encounter for               Plan:      Pt given the [facility] clinic # for pcp   Cbc, cmp, hgba1c,   Fsh/testosterone  Thyroid  panel  mammo ordered, continue yearly until age 73 (due after birthday in July)  Return for ww exam

## 2023-04-17 NOTE — PROGRESS NOTES
You  have antibodies to your thyroid, but your thyroid is function I normal    Please make sure to follow up with your pcp regarding your diabetes-your glucose levels are really elevated    Your testosterone is normal    Your fsh does not show menopause